# Patient Record
(demographics unavailable — no encounter records)

---

## 2024-12-04 NOTE — PHYSICAL EXAM
[Well-appearing] : well-appearing [Normocephalic] : normocephalic [No dysmorphic facial features] : no dysmorphic facial features [No ocular abnormalities] : no ocular abnormalities [Neck supple] : neck supple [Lungs clear] : lungs clear [Heart sounds regular in rate and rhythm] : heart sounds regular in rate and rhythm [Soft] : soft [Alert] : alert [Well related, good eye contact] : well related, good eye contact [Follows instructions well] : follows instructions well [Pupils reactive to light and accommodation] : pupils reactive to light and accommodation [Full extraocular movements] : full extraocular movements [Saccadic and smooth pursuits intact] : saccadic and smooth pursuits intact [No nystagmus] : no nystagmus [Normal facial sensation to light touch] : normal facial sensation to light touch [No facial asymmetry or weakness] : no facial asymmetry or weakness [Gross hearing intact] : gross hearing intact [Equal palate elevation] : equal palate elevation [Good shoulder shrug] : good shoulder shrug [Normal tongue movement] : normal tongue movement [Midline tongue, no fasciculations] : midline tongue, no fasciculations [Normal axial and appendicular muscle tone] : normal axial and appendicular muscle tone [5/5 strength in proximal and distal muscles of arms and legs] : 5/5 strength in proximal and distal muscles of arms and legs [Walks and runs well] : walks and runs well [2+ biceps] : 2+ biceps [Triceps] : triceps [Knee jerks] : knee jerks [Localizes LT and temperature] : localizes LT and temperature [Good walking balance] : good walking balance [Normal gait] : normal gait [de-identified] : Minimal expressive language,  dysarthric at [facility], using communication board

## 2024-12-04 NOTE — HISTORY OF PRESENT ILLNESS
[FreeTextEntry1] : Cherie remains clinically seizure-free.  Since last week she has had episodes of head and eye deviation to the left followed by rapid eye movement side-to-side and facial twitch.  These episodes happen daily without clear loss of awareness.  She has also had episodes in school when she is perceived to have blank staring for some seconds and then will suddenly "jump".  Again no loss of consciousness.

## 2024-12-04 NOTE — ASSESSMENT
[FreeTextEntry1] : 16-year-old history of autism complicated by epilepsy.  Clinically seizure-free over 2 years and tolerating slow wean from oxcarbazepine.  Oxcarbazepine will be further reduced to 120 mL twice daily.  Episodes described by family seem most consistent with facial tic and staring spells are likely breath-holding stems

## 2025-03-13 NOTE — REVIEW OF SYSTEMS
[Difficulty with Vision] : difficulty with vision [Normal] : Psychiatric [FreeTextEntry3] : Farsighted. Wears glasses

## 2025-03-13 NOTE — ASSESSMENT
[FreeTextEntry1] : 16-year-old history of autism complicated by epilepsy.  Clinically seizure-free and tolerating slow wean from oxcarbazepine over the last year.  Oxcarbazepine will be further reduced to 60 mg twice daily.

## 2025-03-13 NOTE — HISTORY OF PRESENT ILLNESS
[FreeTextEntry1] : Cherie remains clinically seizure-free. She is tolerating wean from Oxcarbazepine well.

## 2025-03-13 NOTE — PHYSICAL EXAM
[Well-appearing] : well-appearing [Normocephalic] : normocephalic [No dysmorphic facial features] : no dysmorphic facial features [No ocular abnormalities] : no ocular abnormalities [Neck supple] : neck supple [Lungs clear] : lungs clear [Heart sounds regular in rate and rhythm] : heart sounds regular in rate and rhythm [Soft] : soft [Alert] : alert [Well related, good eye contact] : well related, good eye contact [Follows instructions well] : follows instructions well [Pupils reactive to light and accommodation] : pupils reactive to light and accommodation [Full extraocular movements] : full extraocular movements [Saccadic and smooth pursuits intact] : saccadic and smooth pursuits intact [No nystagmus] : no nystagmus [Normal facial sensation to light touch] : normal facial sensation to light touch [No facial asymmetry or weakness] : no facial asymmetry or weakness [Gross hearing intact] : gross hearing intact [Equal palate elevation] : equal palate elevation [Good shoulder shrug] : good shoulder shrug [Normal tongue movement] : normal tongue movement [Midline tongue, no fasciculations] : midline tongue, no fasciculations [Normal axial and appendicular muscle tone] : normal axial and appendicular muscle tone [5/5 strength in proximal and distal muscles of arms and legs] : 5/5 strength in proximal and distal muscles of arms and legs [Walks and runs well] : walks and runs well [2+ biceps] : 2+ biceps [Triceps] : triceps [Knee jerks] : knee jerks [Localizes LT and temperature] : localizes LT and temperature [Good walking balance] : good walking balance [Normal gait] : normal gait [de-identified] : Minimal expressive language,  dysarthric at [facility], using communication board

## 2025-06-09 NOTE — ASSESSMENT
[FreeTextEntry1] : 16-year-old with autism complicated by OCD and epilepsy.    1. Remains clinically seizure-free on subtherapeutic dosing oxcarbazepine.  Therefore will discontinue oxcarbazepine as scheduled on July 1  2. Episodes of eye and head movements seem most consistent with OCD but does not seem to be interfering with her daily activities so no changes to her fluoxetine for now

## 2025-06-09 NOTE — PHYSICAL EXAM
[Well-appearing] : well-appearing [No dysmorphic facial features] : no dysmorphic facial features [Normocephalic] : normocephalic [No ocular abnormalities] : no ocular abnormalities [Neck supple] : neck supple [Lungs clear] : lungs clear [Heart sounds regular in rate and rhythm] : heart sounds regular in rate and rhythm [Soft] : soft [Alert] : alert [Well related, good eye contact] : well related, good eye contact [Follows instructions well] : follows instructions well [Full extraocular movements] : full extraocular movements [Pupils reactive to light and accommodation] : pupils reactive to light and accommodation [Saccadic and smooth pursuits intact] : saccadic and smooth pursuits intact [No nystagmus] : no nystagmus [Normal facial sensation to light touch] : normal facial sensation to light touch [No facial asymmetry or weakness] : no facial asymmetry or weakness [Equal palate elevation] : equal palate elevation [Gross hearing intact] : gross hearing intact [Normal tongue movement] : normal tongue movement [Good shoulder shrug] : good shoulder shrug [Midline tongue, no fasciculations] : midline tongue, no fasciculations [Normal axial and appendicular muscle tone] : normal axial and appendicular muscle tone [5/5 strength in proximal and distal muscles of arms and legs] : 5/5 strength in proximal and distal muscles of arms and legs [Walks and runs well] : walks and runs well [Triceps] : triceps [2+ biceps] : 2+ biceps [Knee jerks] : knee jerks [Localizes LT and temperature] : localizes LT and temperature [Good walking balance] : good walking balance [Normal gait] : normal gait [de-identified] : Minimal expressive language,  dysarthric at [facility], using communication board

## 2025-06-09 NOTE — HISTORY OF PRESENT ILLNESS
[FreeTextEntry1] : Cherie remains clinically seizure-free. She is tolerating wean from Oxcarbazepine well.  Parents concerned that she continues to have episodic eye and head movements to the side.  They state while these are happening they are able to call her and redirect her.